# Patient Record
Sex: FEMALE | Race: WHITE
[De-identification: names, ages, dates, MRNs, and addresses within clinical notes are randomized per-mention and may not be internally consistent; named-entity substitution may affect disease eponyms.]

---

## 2020-02-21 ENCOUNTER — HOSPITAL ENCOUNTER (EMERGENCY)
Dept: HOSPITAL 8 - ED | Age: 25
LOS: 1 days | Discharge: HOME | End: 2020-02-22
Payer: SELF-PAY

## 2020-02-21 VITALS — HEIGHT: 70 IN | BODY MASS INDEX: 23.2 KG/M2 | WEIGHT: 162.04 LBS

## 2020-02-21 VITALS — SYSTOLIC BLOOD PRESSURE: 106 MMHG | DIASTOLIC BLOOD PRESSURE: 61 MMHG

## 2020-02-21 DIAGNOSIS — O21.9: ICD-10-CM

## 2020-02-21 DIAGNOSIS — Z32.01: Primary | ICD-10-CM

## 2020-02-21 DIAGNOSIS — Z3A.01: ICD-10-CM

## 2020-02-21 LAB
ALBUMIN SERPL-MCNC: 4.1 G/DL (ref 3.4–5)
ALP SERPL-CCNC: 47 U/L (ref 45–117)
ALT SERPL-CCNC: 28 U/L (ref 12–78)
ANION GAP SERPL CALC-SCNC: 12 MMOL/L (ref 5–15)
BASOPHILS # BLD AUTO: 0.04 X10^3/UL (ref 0–0.1)
BASOPHILS NFR BLD AUTO: 0 % (ref 0–1)
BILIRUB SERPL-MCNC: 1.1 MG/DL (ref 0.2–1)
CALCIUM SERPL-MCNC: 9.3 MG/DL (ref 8.5–10.1)
CHLORIDE SERPL-SCNC: 104 MMOL/L (ref 98–107)
CREAT SERPL-MCNC: 0.81 MG/DL (ref 0.55–1.02)
EOSINOPHIL # BLD AUTO: 0 X10^3/UL (ref 0–0.4)
EOSINOPHIL NFR BLD AUTO: 0 % (ref 1–7)
ERYTHROCYTE [DISTWIDTH] IN BLOOD BY AUTOMATED COUNT: 13 % (ref 9.6–15.2)
LYMPHOCYTES # BLD AUTO: 2.08 X10^3/UL (ref 1–3.4)
LYMPHOCYTES NFR BLD AUTO: 15 % (ref 22–44)
MCH RBC QN AUTO: 29.6 PG (ref 27–34.8)
MCHC RBC AUTO-ENTMCNC: 33.8 G/DL (ref 32.4–35.8)
MCV RBC AUTO: 87.6 FL (ref 80–100)
MD: NO
MONOCYTES # BLD AUTO: 0.48 X10^3/UL (ref 0.2–0.8)
MONOCYTES NFR BLD AUTO: 3 % (ref 2–9)
NEUTROPHILS # BLD AUTO: 11.52 X10^3/UL (ref 1.8–6.8)
NEUTROPHILS NFR BLD AUTO: 82 % (ref 42–75)
PLATELET # BLD AUTO: 280 X10^3/UL (ref 130–400)
PMV BLD AUTO: 10.3 FL (ref 7.4–10.4)
PROT SERPL-MCNC: 7.9 G/DL (ref 6.4–8.2)
RBC # BLD AUTO: 4.98 X10^6/UL (ref 3.82–5.3)

## 2020-02-21 PROCEDURE — 36415 COLL VENOUS BLD VENIPUNCTURE: CPT

## 2020-02-21 PROCEDURE — 99284 EMERGENCY DEPT VISIT MOD MDM: CPT

## 2020-02-21 PROCEDURE — 76801 OB US < 14 WKS SINGLE FETUS: CPT

## 2020-02-21 PROCEDURE — 83690 ASSAY OF LIPASE: CPT

## 2020-02-21 PROCEDURE — 84703 CHORIONIC GONADOTROPIN ASSAY: CPT

## 2020-02-21 PROCEDURE — 85025 COMPLETE CBC W/AUTO DIFF WBC: CPT

## 2020-02-21 PROCEDURE — 96374 THER/PROPH/DIAG INJ IV PUSH: CPT

## 2020-02-21 PROCEDURE — 96361 HYDRATE IV INFUSION ADD-ON: CPT

## 2020-02-21 PROCEDURE — 96372 THER/PROPH/DIAG INJ SC/IM: CPT

## 2020-02-21 PROCEDURE — 80053 COMPREHEN METABOLIC PANEL: CPT

## 2020-02-21 PROCEDURE — 84702 CHORIONIC GONADOTROPIN TEST: CPT

## 2020-02-21 NOTE — NUR
RN called to lobby BR for pt assist. Pt found sitting on floor in BR dry 
heaving into toilet. Pt states she lowered herself to floor s/t vomiting. 
Denies fall. Denies LOC. Assisted pt to w/c and back lobby. Charge RN aware and 
no room available at this time. Verified pt allergies--denies. Denies medical 
hx or home meds. Pt medicated per protocol with Zofran and updated to 
POC--awaiting room in ER.

## 2020-02-21 NOTE — NUR
-------------------------------------------------------------------------------

           *** Note undone in [facility] - 02/21/20 at 2141 by EMMANUEL ***            

-------------------------------------------------------------------------------

PT IN  CURRENTLY, TECH WILL BRING TO ROOM 3 WHEN DONE

## 2020-02-21 NOTE — NUR
THIS IS A 27YF THAT COMES IN FOR EXCESSIVE VOMITING X2 DAYS. PT ARRIVED TO ROOM 
VIA WC CINDY MEDRANO. NICKOLAS VELASOC AT BEDSIDE TO ASSESS PT, PT CONNECTED TO 
MONITORING, VSS, PIV INSERTED, PT MEDICATED PER MAR. BOLUS STARTED.

## 2020-02-21 NOTE — NUR
PT EDUCATED ON NEED FOR URINE SAMPLE, PT STS SHE CANNOT PEE AT THIS TIME. BOLUS 
STILL INFUSING. AWAITING FURTHER ORDERS